# Patient Record
Sex: MALE | Race: WHITE | Employment: OTHER | URBAN - METROPOLITAN AREA
[De-identification: names, ages, dates, MRNs, and addresses within clinical notes are randomized per-mention and may not be internally consistent; named-entity substitution may affect disease eponyms.]

---

## 2024-01-05 ENCOUNTER — HOSPITAL ENCOUNTER (OUTPATIENT)
Age: 34
Discharge: HOME | End: 2024-01-05
Payer: COMMERCIAL

## 2024-01-05 ENCOUNTER — APPOINTMENT (OUTPATIENT)
Dept: GENERAL RADIOLOGY | Age: 34
End: 2024-01-05
Attending: PHYSICIAN ASSISTANT
Payer: COMMERCIAL

## 2024-01-05 VITALS
SYSTOLIC BLOOD PRESSURE: 124 MMHG | TEMPERATURE: 99 F | DIASTOLIC BLOOD PRESSURE: 68 MMHG | RESPIRATION RATE: 20 BRPM | OXYGEN SATURATION: 100 % | HEART RATE: 89 BPM

## 2024-01-05 DIAGNOSIS — U07.1 COVID-19 VIRUS INFECTION: ICD-10-CM

## 2024-01-05 DIAGNOSIS — R05.1 ACUTE COUGH: ICD-10-CM

## 2024-01-05 DIAGNOSIS — J18.9 COMMUNITY ACQUIRED PNEUMONIA OF LEFT UPPER LOBE OF LUNG: Primary | ICD-10-CM

## 2024-01-05 DIAGNOSIS — Z20.822 ENCOUNTER FOR LABORATORY TESTING FOR COVID-19 VIRUS: ICD-10-CM

## 2024-01-05 LAB — SARS-COV-2 RNA RESP QL NAA+PROBE: DETECTED

## 2024-01-05 PROCEDURE — 99204 OFFICE O/P NEW MOD 45 MIN: CPT | Performed by: PHYSICIAN ASSISTANT

## 2024-01-05 PROCEDURE — 71046 X-RAY EXAM CHEST 2 VIEWS: CPT | Performed by: PHYSICIAN ASSISTANT

## 2024-01-05 PROCEDURE — U0002 COVID-19 LAB TEST NON-CDC: HCPCS | Performed by: PHYSICIAN ASSISTANT

## 2024-01-05 RX ORDER — PREDNISONE 20 MG/1
60 TABLET ORAL ONCE
Status: COMPLETED | OUTPATIENT
Start: 2024-01-05 | End: 2024-01-05

## 2024-01-05 RX ORDER — IPRATROPIUM BROMIDE AND ALBUTEROL SULFATE 2.5; .5 MG/3ML; MG/3ML
3 SOLUTION RESPIRATORY (INHALATION) ONCE
Status: DISCONTINUED | OUTPATIENT
Start: 2024-01-05 | End: 2024-01-05

## 2024-01-05 RX ORDER — ALBUTEROL SULFATE 90 UG/1
2 AEROSOL, METERED RESPIRATORY (INHALATION) ONCE
Status: COMPLETED | OUTPATIENT
Start: 2024-01-05 | End: 2024-01-05

## 2024-01-05 RX ORDER — GUAIFENESIN AND DEXTROMETHORPHAN HYDROBROMIDE 1200; 60 MG/1; MG/1
1 TABLET, EXTENDED RELEASE ORAL EVERY 12 HOURS
Qty: 30 TABLET | Refills: 0 | Status: SHIPPED | OUTPATIENT
Start: 2024-01-05

## 2024-01-05 RX ORDER — DOXYCYCLINE HYCLATE 100 MG/1
100 CAPSULE ORAL 2 TIMES DAILY
Qty: 14 CAPSULE | Refills: 0 | Status: SHIPPED | OUTPATIENT
Start: 2024-01-05 | End: 2024-01-12

## 2024-01-05 RX ORDER — PREDNISONE 20 MG/1
40 TABLET ORAL DAILY
Qty: 10 TABLET | Refills: 0 | Status: SHIPPED | OUTPATIENT
Start: 2024-01-05 | End: 2024-01-10

## 2024-01-05 RX ORDER — BENZONATATE 200 MG/1
200 CAPSULE ORAL 3 TIMES DAILY PRN
Qty: 30 CAPSULE | Refills: 0 | Status: SHIPPED | OUTPATIENT
Start: 2024-01-05

## 2024-01-05 NOTE — ED INITIAL ASSESSMENT (HPI)
Pt states having a mild cold since 12/22, pt states 2 days ago symptoms became worse pt states having chest congestion pt states feels wheezing in his chest. Pt denies fevers NVD. Pt states having some slight HA due to the all the coughing.

## 2024-01-05 NOTE — ED PROVIDER NOTES
Patient Seen in: [facility]      History     Chief Complaint   Patient presents with    Cough/URI     Stated Complaint: Sore Throat,Cough    Subjective:   HPI    Patient is a 33-year-old male, no significant past medical history, non-smoker, presenting to immediate care for evaluation of cold-like symptoms for approximately 2 weeks.  Symptoms while in [place], [place].  States multiple sick contacts.  States he has been having intermittent nasal congestion and cough.  Symptoms worsening the last several days.  Now having associated chest congestion with associated rattling/wheezing sensation in chest.  He denies prior episodes.  Has been taking over-the-counter Delsym for symptoms with minimal relief.  He denies any fevers.  No chest pain or shortness of breath.  No cardiopulmonary history.  He is not immunocompromise    Objective:   History reviewed. No pertinent past medical history.           No pertinent past surgical history.              No pertinent social history.            Review of Systems   Constitutional:  Negative for chills and fever.   HENT:  Positive for congestion and rhinorrhea.    Eyes:  Negative for visual disturbance.   Respiratory:  Positive for cough and wheezing. Negative for shortness of breath.    Cardiovascular:  Negative for chest pain.   Gastrointestinal:  Negative for abdominal pain, diarrhea and vomiting.   Musculoskeletal:  Negative for neck stiffness.   Skin:  Negative for rash.   Allergic/Immunologic: Negative for immunocompromised state.   Neurological:  Negative for dizziness, seizures, weakness and light-headedness.   Psychiatric/Behavioral:  Negative for confusion.    All other systems reviewed and are negative.      Positive for stated complaint: Sore Throat,Cough  Other systems are as noted in HPI.  Constitutional and vital signs reviewed.      All other systems reviewed and negative except as noted above.    Physical Exam     ED Triage Vitals [01/05/24 1149]    /68   Pulse 89   Resp 20   Temp 98.7 °F (37.1 °C)   Temp src Temporal   SpO2 100 %   O2 Device None (Room air)       Current:/68   Pulse 89   Temp 98.7 °F (37.1 °C) (Temporal)   Resp 20   SpO2 100%         Physical Exam  Vitals and nursing note reviewed.   Constitutional:       General: He is not in acute distress.     Appearance: Normal appearance. He is not ill-appearing, toxic-appearing or diaphoretic.   HENT:      Head: Normocephalic and atraumatic.      Nose: Congestion and rhinorrhea present.      Mouth/Throat:      Mouth: Mucous membranes are moist.      Pharynx: No oropharyngeal exudate or posterior oropharyngeal erythema.      Comments: Post nasal drip  Eyes:      Conjunctiva/sclera: Conjunctivae normal.   Cardiovascular:      Rate and Rhythm: Normal rate.      Pulses: Normal pulses.   Pulmonary:      Effort: Pulmonary effort is normal. No respiratory distress.      Breath sounds: Wheezing present.      Comments: Lungs diminished with bibasilar expiratory wheezing.  No conversational dyspnea.  No retractions.  No Increased work of breathing.  Abdominal:      Palpations: Abdomen is soft.      Tenderness: There is no abdominal tenderness.   Musculoskeletal:         General: No swelling or tenderness. Normal range of motion.      Cervical back: Neck supple. No rigidity.   Skin:     Capillary Refill: Capillary refill takes less than 2 seconds.      Findings: No rash.   Neurological:      General: No focal deficit present.      Mental Status: He is alert.      Coordination: Coordination normal.   Psychiatric:         Mood and Affect: Mood normal.         Behavior: Behavior normal.           ED Course     Labs Reviewed   RAPID SARS-COV-2 BY PCR - Abnormal; Notable for the following components:       Result Value    Rapid SARS-CoV-2 by PCR Detected (*)     All other components within normal limits       Results for orders placed or performed during the hospital encounter of 01/05/24   Rapid  SARS-CoV-2 by PCR    Collection Time: 01/05/24 12:12 PM    Specimen: Nares; Other   Result Value Ref Range    Rapid SARS-CoV-2 by PCR Detected (A) Not Detected     XR CHEST PA + LAT CHEST (CPT=71046)   Final Result   PROCEDURE: XR CHEST PA + LAT CHEST (CPT=71046)       COMPARISON: None.       INDICATIONS: Cough x 2 weeks.       TECHNIQUE:   Two views.         FINDINGS:    CARDIAC/VASC: Normal.  No cardiac silhouette abnormality or cardiomegaly.     Unremarkable pulmonary vasculature.     MEDIAST/JULIANO: No visible mass or adenopathy.    LUNGS/PLEURA: There is alveolar opacity in the lingula posterior    laterally.  No pleural effusion or pneumothorax.   BONES: No fracture or visible bony lesion.    OTHER: Negative.                     =====   CONCLUSION:    Lingular pneumonia.               Dictated by (CST): Bib Cardoso MD on 1/05/2024 at 12:40 PM        Finalized by (CST): Bib Cardoso MD on 1/05/2024 at 12:41 PM                        MDM     Dx: Pneumonia of Left Lung (Lingular),  Initial Encounter  Dx: COVID Virus Infection  Onset: 12/22/2023  Differential: viral versus bacterial etiology  COVID PCR is Positive  No severe disease  No hypoxia  Not requiring hospitalization  Not immunocompromise  No chest pain or shortness of breath  Overall well-appearing  Hemodynamically stable  Afebrile  Tolerating PO  Outpatient management  Supportive care  Rest  Oral hydration  Motrin/Tylenol as needed for pain/fever/myalgias  OTC Anti-tussive  Rx doxycycline 7 days for CAP  Rx Prednisone 5 days for wheezing/chest congestion  Rx Tessalon and Mucinex for cough/congestion  Albuterol inhaler as needed for shortness of breath/wheezing  PCP follow  ED Return precaution  Discharge instructions pneumonia    Start Taking               doxycycline 100 MG Oral Cap Take 1 capsule (100 mg total) by mouth 2 (two) times daily for 7 days.    benzonatate 200 MG Oral Cap Take 1 capsule (200 mg total) by mouth 3 (three) times daily  as needed for cough.    dextromethorphan-guaifenesin ER (MUCINEX DM MAXIMUM STRENGTH)  MG Oral Tablet 12 Hr Take 1 tablet by mouth every 12 (twelve) hours.    predniSONE 20 MG Oral Tab Take 2 tablets (40 mg total) by mouth daily for 5 days.          Ordered Facility-Administered Medications         Dose Freq    ipratropium-albuterol (Duoneb) 0.5-2.5 (3) MG/3ML inhalation solution 3 mL (Discontinued) 3 mL Once    Route: Nebulization    Reason for Discontinue: Physician directed    predniSONE (Deltasone) tab 60 mg 60 mg Once    Route: Oral    predniSONE (Deltasone) 20 MG tab      Notes to Pharmacy: [facility]   : cabinet override    ipratropium-albuterol (Duoneb) 0.5-2.5 (3) MG/3ML inhalation solution      Notes to Pharmacy: [facility]   : cabinet override    albuterol (Ventolin HFA) 108 (90 Base) MCG/ACT inhaler 2 puff 2 puff Once    Route: Inhalation    albuterol (Ventolin HFA) 108 (90 Base) MCG/ACT inhaler      Notes to Pharmacy: [facility] N[facility]   : cabinet override                        Medical Decision Making      Disposition and Plan     Clinical Impression:  1. Community acquired pneumonia of left upper lobe of lung    2. Encounter for laboratory testing for COVID-19 virus    3. Acute cough    4. COVID-19 virus infection         Disposition:  Discharge  1/5/2024  1:06 pm    Follow-up:  Mary Knott MD  [address] [address]  [phone]      Doctor Bruno Armijo          Medications Prescribed:  Current Discharge Medication List        START taking these medications    Details   doxycycline 100 MG Oral Cap Take 1 capsule (100 mg total) by mouth 2 (two) times daily for 7 days.  Qty: 14 capsule, Refills: 0      benzonatate 200 MG Oral Cap Take 1 capsule (200 mg total) by mouth 3 (three) times daily as needed for cough.  Qty: 30 capsule, Refills: 0      dextromethorphan-guaifenesin ER (MUCINEX DM MAXIMUM STRENGTH)  MG Oral Tablet 12 Hr Take 1 tablet by mouth  every 12 (twelve) hours.  Qty: 30 tablet, Refills: 0      predniSONE 20 MG Oral Tab Take 2 tablets (40 mg total) by mouth daily for 5 days.  Qty: 10 tablet, Refills: 0

## 2024-05-22 ENCOUNTER — APPOINTMENT (OUTPATIENT)
Dept: GENERAL RADIOLOGY | Age: 34
End: 2024-05-22
Attending: NURSE PRACTITIONER

## 2024-05-22 ENCOUNTER — HOSPITAL ENCOUNTER (OUTPATIENT)
Age: 34
Discharge: HOME | End: 2024-05-22

## 2024-05-22 VITALS
OXYGEN SATURATION: 100 % | DIASTOLIC BLOOD PRESSURE: 90 MMHG | HEART RATE: 76 BPM | RESPIRATION RATE: 20 BRPM | TEMPERATURE: 98 F | SYSTOLIC BLOOD PRESSURE: 142 MMHG

## 2024-05-22 DIAGNOSIS — R05.2 SUBACUTE COUGH: Primary | ICD-10-CM

## 2024-05-22 PROCEDURE — 99203 OFFICE O/P NEW LOW 30 MIN: CPT | Performed by: NURSE PRACTITIONER

## 2024-05-22 PROCEDURE — 71046 X-RAY EXAM CHEST 2 VIEWS: CPT | Performed by: NURSE PRACTITIONER

## 2024-05-22 NOTE — ED INITIAL ASSESSMENT (HPI)
Pt here with a productive cough with green sputum for 1 month. Denies any other symptoms at this time.

## 2024-05-22 NOTE — ED PROVIDER NOTES
Patient Seen in: [facility]      History     Chief Complaint   Patient presents with    Cough/URI     Stated Complaint: COUGH    Subjective:   32 y/o male with htn presents with c/o cough x 1 month.  States had fever, nasal congestion and runny nose at onset of symptoms.  Other symptoms have resolved but continues to have a cough.  States cough has become more productive with some green phlegm.  Wife with similar cough.  No fever/chills, chest pain, shortness of breath, abdominal pain, nausea/vomiting/diarrhea.            Objective:   History reviewed. No pertinent past medical history.           History reviewed. No pertinent surgical history.             Social History     Socioeconomic History    Marital status:    Tobacco Use    Smoking status: Never    Smokeless tobacco: Never     Social Determinants of Health     Physical Activity: Unknown (3/22/2022)    [facility]    Exercise Vital Sign     Days of Exercise per Week: 0 days    [facility], [facility]    Social Connections    Received from [facility], [facility]    Housing Stability              Review of Systems   Constitutional:  Negative for chills and fever.   HENT:  Negative for congestion and sore throat.    Respiratory:  Positive for cough. Negative for shortness of breath.    Cardiovascular:  Negative for chest pain.   Gastrointestinal:  Negative for abdominal pain, diarrhea, nausea and vomiting.   Neurological:  Negative for headaches.   All other systems reviewed and are negative.      Positive for stated complaint: COUGH  Other systems are as noted in HPI.  Constitutional and vital signs reviewed.      All other systems reviewed and negative except as noted above.    Physical Exam     ED Triage Vitals [05/22/24 1141]   /90   Pulse 76   Resp 20   Temp 98 °F (36.7 °C)   Temp src Temporal   SpO2 100 %   O2  Device None (Room air)       Current Vitals:   Vital Signs  BP: 142/90  Pulse: 76  Resp: 20  Temp: 98 °F (36.7 °C)  Temp src: Temporal    Oxygen Therapy  SpO2: 100 %  O2 Device: None (Room air)            Physical Exam  Vitals and nursing note reviewed.   Constitutional:       General: He is not in acute distress.     Appearance: Normal appearance. He is not ill-appearing.   HENT:      Head: Normocephalic.      Right Ear: Tympanic membrane and external ear normal.      Left Ear: Tympanic membrane and external ear normal.      Nose: Nose normal.      Mouth/Throat:      Mouth: Mucous membranes are moist.      Pharynx: Oropharynx is clear. Uvula midline.      Tonsils: No tonsillar exudate.   Cardiovascular:      Rate and Rhythm: Normal rate and regular rhythm.   Pulmonary:      Effort: Pulmonary effort is normal.      Breath sounds: Normal breath sounds.   Musculoskeletal:         General: Normal range of motion.      Cervical back: Normal range of motion and neck supple.   Skin:     General: Skin is warm and dry.      Capillary Refill: Capillary refill takes less than 2 seconds.   Neurological:      Mental Status: He is alert and oriented to person, place, and time.      GCS: GCS eye subscore is 4. GCS verbal subscore is 5. GCS motor subscore is 6.   Psychiatric:         Behavior: Behavior is cooperative.               ED Course   Labs Reviewed - No data to display  XR CHEST PA + LAT CHEST (CPT=71046)    Result Date: 5/22/2024  CONCLUSION:  1. No acute cardiopulmonary disease. 2. Previous lingular segment pneumonitis has resolved.    Dictated by (CST): Parviz Marshall MD on 5/22/2024 at 12:39 PM     Finalized by (CST): Parviz Marshall MD on 5/22/2024 at 12:41 PM                          [facility]              Medical Decision Making  Patient is well-appearing.  I discussed differentials with patient including but not limited to viral bronchitis vs pneumonia  Chest x-ray ordered.  Chest x-ray independently reviewed,  results discussed with patient.  Negative for pneumonia  Push fluids  otc meds prn  Fu with PCP. Return/ ED precautions discussed      Problems Addressed:  Subacute cough: acute illness or injury    Amount and/or Complexity of Data Reviewed  Radiology: ordered. Decision-making details documented in ED Course.    Risk  OTC drugs.        Disposition and Plan     Clinical Impression:  1. Subacute cough         Disposition:  Discharge  5/22/2024 12:53 pm    Follow-up:  Mary Knott MD  [address] [id number]  [phone]    Schedule an appointment as soon as possible for a visit   or follow up with your Primary Doctor          Medications Prescribed:  Discharge Medication List as of 5/22/2024 12:54 PM